# Patient Record
Sex: MALE | Race: WHITE
[De-identification: names, ages, dates, MRNs, and addresses within clinical notes are randomized per-mention and may not be internally consistent; named-entity substitution may affect disease eponyms.]

---

## 2019-02-06 ENCOUNTER — HOSPITAL ENCOUNTER (OUTPATIENT)
Dept: HOSPITAL 84 - D.CATH | Age: 69
Discharge: HOME | End: 2019-02-06
Attending: INTERNAL MEDICINE
Payer: MEDICARE

## 2019-02-06 VITALS
HEIGHT: 73 IN | HEIGHT: 73 IN | BODY MASS INDEX: 41.75 KG/M2 | WEIGHT: 315 LBS | BODY MASS INDEX: 41.75 KG/M2 | WEIGHT: 315 LBS

## 2019-02-06 VITALS — DIASTOLIC BLOOD PRESSURE: 80 MMHG | SYSTOLIC BLOOD PRESSURE: 147 MMHG

## 2019-02-06 DIAGNOSIS — I25.110: Primary | ICD-10-CM

## 2019-02-06 LAB
ANION GAP SERPL CALC-SCNC: 14.9 MMOL/L (ref 8–16)
BASOPHILS NFR BLD AUTO: 0.6 % (ref 0–2)
BUN SERPL-MCNC: 14 MG/DL (ref 7–18)
CALCIUM SERPL-MCNC: 8.7 MG/DL (ref 8.5–10.1)
CHLORIDE SERPL-SCNC: 108 MMOL/L (ref 98–107)
CO2 SERPL-SCNC: 26 MMOL/L (ref 21–32)
CREAT SERPL-MCNC: 1.1 MG/DL (ref 0.6–1.3)
EOSINOPHIL NFR BLD: 2.4 % (ref 0–7)
ERYTHROCYTE [DISTWIDTH] IN BLOOD BY AUTOMATED COUNT: 14.3 % (ref 11.5–14.5)
GLUCOSE SERPL-MCNC: 111 MG/DL (ref 74–106)
HCT VFR BLD CALC: 45.6 % (ref 42–54)
HGB BLD-MCNC: 15.4 G/DL (ref 13.5–17.5)
IMM GRANULOCYTES NFR BLD: 0.4 % (ref 0–5)
LYMPHOCYTES NFR BLD AUTO: 30.2 % (ref 15–50)
MCH RBC QN AUTO: 30.4 PG (ref 26–34)
MCHC RBC AUTO-ENTMCNC: 33.8 G/DL (ref 31–37)
MCV RBC: 89.9 FL (ref 80–100)
MONOCYTES NFR BLD: 8.8 % (ref 2–11)
NEUTROPHILS NFR BLD AUTO: 57.6 % (ref 40–80)
OSMOLALITY SERPL CALC.SUM OF ELEC: 290 MOSM/KG (ref 275–300)
PLATELET # BLD: 217 10X3/UL (ref 130–400)
PMV BLD AUTO: 9.9 FL (ref 7.4–10.4)
POTASSIUM SERPL-SCNC: 3.9 MMOL/L (ref 3.5–5.1)
RBC # BLD AUTO: 5.07 10X6/UL (ref 4.2–6.1)
SODIUM SERPL-SCNC: 145 MMOL/L (ref 136–145)
WBC # BLD AUTO: 7 10X3/UL (ref 4.8–10.8)

## 2019-02-06 NOTE — NUR
4 CC AIR REMOVED FROM ZYPHER BAND WITH NO BLEEDING NOTED. PIV REMOVED
WITH DRESSING APPLIED. PATIENT DENIED CHEST PAIN. UP TO GET DRESSED
FOR DISCHARGE HOME NO DISTRESS NOTED.

## 2019-02-06 NOTE — NUR
ZYPHER BAND REMOVED WITH DRESSING APPLIED. NO BLEEDING OR HEMATOMA
PATIENT DENIED CHEST PAIN.LEFT VIA WC TO PARKING FOR TRANSPORT HOME
WITH WIFE

## 2019-02-06 NOTE — NUR
REPOSITIONED HOB UP 30 WITH ZYPHER BAND TO R/WRIST CDI NO BLEEDING
NOTED. PATIENT DENIED NAUSEA OR CHEST PAIN VSS

## 2019-02-06 NOTE — HEMODYNAMI
PATIENT:SAUL DE LA CRUZ                         MEDICAL RECORD: [id number]
: 50                                            LOCATION:D.CAT          
ACCT# [id number]                                       ADMISSION DATE: 19
 
 
 Generatedon:201913:45
Patient name: SAUL DE LA CRUZ Patient #: [id number] Visit #: [id number] SSN:
 : 1950 Date
of study: 2019
Page: Of
Hemodynamic Procedure Report
****************************
Patient Data
Patient Demographics
Procedure consent was obtained
First Name: SAUL         Gender: Male
Last Name: DOROTHY        : 1950
Middle Initial: RAVI      Age: 68 year(s)
Patient #: [id number]       Race: Unknown
Visit #: [id number]
Accession #:
[id number]
Additional ID: [id number]
Contact details
Address: [place] [address]         Phone: [phone]
State: [place]
City: [place]
Zip code: [address]
Past Medical History
Allergies
Allergen        Reaction        Date         Comments
Reported
Other allergy                   2019    PCN, MOLD
Other allergy                   2019     PCN, MOLD
Admission
Admission Data
Admission Date: 2019    Admission Time: 11:06
Admit Source: Other
Height (in.): .39           BSA: 0.06 (m2)
Height (cm.): 1             BMI: 3956619 (kg/m2)
Weight (lbs.): 340
Weight (kg.): 154.22
Lab Results
Lab Result Date: 2019   Lab Result Time: 0:08
Biochemistry
Name         Units    Result                Min      Max
BUN          mg/dl    14       --(--*-)--   7        18
Creatinine   mg/dl    1.1      --(--*-)--   0.6      1.3
CBC
Name         Units    Result                Min      Max
Hematocrit   %        45.6     --(-*--)--   42       54
Hemoglobin   g/dl     15.4     --(-*--)--   13.5     17.5
Procedure
Procedure Types
Cath Procedure
PCI Procedure
Coronary Stent
Coronary Stent Initial x2
Procedure Description
 
Procedure Date
Procedure Date: 2019
Procedure Start Time: 13:15
Procedure End Time: 13:43
Procedure Staff
Name                            Function
Chapito Alberts RT                  Monitor
Broderick Roth MD                   Performing Physician
Tj Madrigal RT                  Scrub
Gissel Robbins RN                Nurse
Uzma Cruz RT             Monitor
Procedure Data
Cath Procedure
Fluoroscopy
Diagnostic fluoroscopy      Total fluoroscopy Time: 5.5
time: 5.5 min               min
Diagnostic fluoroscopy      Total fluoroscopy dose:
dose: 1894 mGy              1894 mGy
Contrast Material
Contrast Material Type                       Amount (ml)
Isovue 300                                   111
Entry Location
Entry     Primary  Successful  Side  Size  Upsize Upsize Entry    Closure     Blanco
ccessful  Closure
Location                             (Fr)  1 (Fr) 2 (Fr) Remarks  Device        
          Remarks
Radial                         Right 6 Fr                         Mechanical
artery                               Short                        Compression
Estimated blood loss: 5 ml
Procedure Complications
No complications
Procedure Medications
Medication           Administration Route Dosage
0.9% NaCl            I.V.                 100 ml/hr
Oxygen               etCO2 Nasal cannula  2 l/min
Lidocaine 2%         added to field       20
Heparin Flush Bag    added to field       2 bags
(1000units/500ml NS)
Radial Cocktail      added to field       1 syringe
(Verapomil 2mg/Nitro
400mcg/Heparin
1500units)
Versed               I.V.                 2 mg
Fentanyl             I.V.                 50 mcg
Heparin Bolus                             16545 units
Versed               I.V.                 2 mg
Fentanyl             I.V.                 50 mcg
Fentanyl             I.V.                 50 mcg
Versed               I.V.                 2 mg
Plavix               P.O.                 600 mg
Hemodynamics
Rest
BSA: 0.06 (m2) HGB: 15.4 (g/dl) O2 Consumption: Estimated: 6.77 (ml/min) O2 Cons
umption indexed:
Estimated:112.83 (ml/min/m) Heart Rate: 61 (bpm)
Snapshots
Pre Cath      Intra         NCS           Post Cath
Vital Signs
Time     Heart  Resp   SPO2 etCO2   NIBP (mmHg) Rhythm  Pain    Sedation
Rate   (ipm)  (%)  (mmHg)                      Status  Level
 
(bpm)
13:05:25 60     14     99   14.9    171/98(158) NSR     0 (11)  10(A)
, No
pain
13:09:53 62     10     98   15.6    174/97(125) NSR     0 (11)  10(A)
, No
pain
13:14:26 63     11     98   16.8    170/86(123) NSR     0 (11)  10(A)
, No
pain
13:18:50 67     13     98   20.6    144/82(114) NSR     0 (11)  10(A)
, No
pain
13:24:07 67     14     96   32      149/77(111) NSR     0 (11)  9(A)
, No
pain
13:28:29 67     12     96   29      157/87(123) NSR     0 (11)  9(A)
, No
pain
13:32:47 63     13     97   29.8    155/90(120) NSR     0 (11)  9(A)
, No
pain
13:37:10 69     14     98   34.2    145/94(117) NSR     0 (11)  9(A)
, No
pain
13:41:28 67     13     99   34.2    145/85(118) NSR     0 (11)  10(A)
, No
pain
Medications
Time     Medication       Route   Dose    Verified Delivered Reason          Not
es    Effectiveness
by       by
13:02:05 0.9% NaCl        I.V.    100     Broderick     Gissel     used for
ml/hr   Gonzalez Robbins   procedure
RN
13:02:13 Oxygen           etCO2   2 l/min Broderick     Gissel     used for
Nasal           Gonzalez Robbins   procedure
cannula                  RN
13:02:31 Lidocaine 2%     added   20ml    Broderick     Broderick      for local
to      vial    Gonzalez Roth MD  anesthetic
field
13:02:36 Heparin Flush    added   2 bags  Broderick     Broderick      used for
Bag              to              Gonzalez Roth MD  procedure
(1000units/500ml field
NS)
13:02:44 Radial Cocktail  added   1       Broderick     Broderick      used for
(Verapomil       to      syringe Gonzalez Roth MD  procedure
2mg/Nitro        field
400mcg/Hepari
13:13:50 Versed           I.V.    2 mg    Broderick     Gissel     for
Gonzalez Robbins   anticoagulation
RN
13:14:10 Fentanyl         I.V.    50 mcg  Broderick     Gissel     for sedation
Gonzalez Robbins
RN
13:19:29 Heparin Bolus            57066   Broderick     Gissel     for             ponce
ified
units   Gonzalez Robbins   anticoagulation with Dr. BEKA Roth
13:19:48 Fentanyl         I.V.    50 mcg  Broderick     Gissel     for sedation
 
Gonzalez Robbins
RN
13:19:48 Versed           I.V.    2 mg    Broderick     Gissel     for
Gonzalez Robbins   anticoagulation
RN
13:29:16 Fentanyl         I.V.    50 mcg  Broderick     Gissel     for sedation
Gonzalez Robbins
RN
13:29:43 Versed           I.V.    2 mg    Broderick     Gissel     for
Gonzalez Robbins   anticoagulation
RN
13:42:19 Plavix           P.O.    600 mg  Broderick     Gissel     for
Roth MD Rosendo   antiplatelet
RN        therapy
Procedure Log
Time     Note
12:34:40 Informed consent obtained and on chart
12:34:44 Admit Source: Other
12:35:27 Diagnostic Cath status Elective
12:35:28 Time tracking: Regular hours (M-F 7:00 - 5:00)
12:35:32 Plan of Care:Hemodynamics will remain stable., Cardiac
rhythm will remain stable., Comfort level will be
maintained., Respiratory function will remain
adequate., Patient/ family verbilizes understanding of
procedure., Procedure tolerated without complication.,
Recovers from procedure without complications..
12:35:59 H&P Date Dictated: 2019 Within 30 days and on
chart., H&P Addendum completed by physician on day of
procedure. (MUST COMPLETE FOR ALL OUTPATIENTS).
12:36:18 Patient allergic to Other allergyPCN, MOLD
12:39:06 Lab Result : Hemoglobin 15.4 g/dl
12:39:06 Lab Result : Hematocrit 45.6 %
12:39:06 Lab Result : BUN 14 mg/dl
12:39:06 Lab Result : Creatinine 1.1 mg/dl
12:39:08 Lab results completed and on chart.
12:39:14 Chapito Alberts RT(R) (CV) sent for patient. Start room
use.
12:52:33 Patient received from Pre/Post Procedure Room to CCL 1
Alert and oriented. Tansferred to table in Supine
position.
12:52:34 Correct patient and procedure confirmed by team.
12:52:34 Warm blankets applied, and janes hugger turned on for
patient comfort.
12:52:35 ECG and BP/O2 sat monitors applied to patient.
12:52:36 Pre-procedure instructions explained to patient.
12:52:37 Pre-op teaching completed and patient verbalized
understanding.
12:52:38 Family in waiting room.
12:52:39 Patient NPO since Midnight.
12:53:17 Is patient on blood thinner?No
12:53:18 Patient diabetic? No.
12:53:21 Previous problem with sedation/anesthesia? No ?
12:53:22 Sleep apnea? No
12:53:22 Snore? No
12:53:24 Opens mouth fully? Yes
12:53:24 Deviated septum? No
12:53:25 Sticks out tongue? Yes
12:53:27 Airway obstruction? No ?
12:53:30 Dentures? Yes UPPER IN TIGHT
12:53:36 Modified Yang's test Ulnar > 7 seconds.
 
12:53:38 Patient pain scale 0/10 ?.
12:53:42 IV patent on arrival in left forearm with 0.9% NaCl at
[facility].
13:01:40 Vital chart was started
13:02:05 0.9% NaCl 100 ml/hr I.V. was administered by Gissel Robbins RN; used for procedure;
13:02:13 Oxygen 2 l/min etCO2 Nasal cannula was administered by
Gissel Robbins RN; used for procedure;
13:02:31 Lidocaine 2% 20ml vial added to field was administered
by Broderick Roth MD; for local anesthetic;
13:02:36 Heparin Flush Bag (1000units/500ml NS) 2 bags added to
field was administered by Broderick Roth MD; used for
procedure;
13:02:44 Radial Cocktail (Verapomil 2mg/Nitro 400mcg/Heparin
1500units) 1 syringe added to field was administered
by Broderick Roth MD; used for procedure;
13:05:07 Use device set Radial Dx or PCI
13:05:09 ACIST Syringe (06714) opened to sterile field.
13:05:10 Medline Cath Pack ([id number]) opened to sterile field.
13:05:11 Bag Decanter (2002S) opened to sterile field.
13:05:15 ACIST Hand Control (96011) opened to sterile field.
13:05:20 ACIST Manifold (09889) opened to sterile field.
13:05:21 Tegaderm 4 x 4 (1626W) opened to sterile field.
13:05:22 MBrace Wrist Support ([id number]) opened to sterile
field.
13:05:32 SHEATH 6FR Slender () opened to sterile field.
13:06:08 EMERALD Guide Wire (096-433) opened to sterile field.
13:06:57 TUBING High Pressure Extension Tubing (Gonzalez)
(LV2341F) opened to sterile field.
13:09:37 Right Radial & Right Groin area was prepped with
chlora-prep and draped in sterile fashion
13:09:38 Sharps counted by scrub and verified by R.N.
13:09:38 Alarms reviewed by R. N.
13:09:46 Full Disclosure recording started
13:09:47 Baseline sample Acquired.
13:09:51 Rhythm: sinus rhythm
13:12:08 Final Timeout: patient, procedure, and site verified
with staff and physician. All members of the team are
in agreement.
13:12:11 Right groin site verified by team.
13:12:15 Fire Safety Assessment: A--An alcohol-based skin
anteseptic being used preoperatively., B--The
operative or invasive procedure is being performed
above the xiphoid process or in the oropharynx., D--An
ESU, laser, or fiber-optic light is being used.
13:12:22 Physical assessment completed. ASA score P 2 - A
patient with mild systemic disease as per Broderick Roth MD.
13:12:25 Sedation plan: IV Moderate Sedation Medication:Versed,
Fentanyl
13:12:29 Zero performed for pressure channel P1
13:12:42 Zero performed for pressure channel P1
13:13:50 Versed 2 mg I.V. was administered by Gissel Robbins RN;
for anticoagulation;
13:14:10 Fentanyl 50 mcg I.V. was administered by Gissel Robbins
RN; for sedation;
13:15:14 Procedure started.
13:15:20 Local anesthetic to right radial artery with Lidocaine
2% by Broderick Roth MD.**INITIAL ACCESS ONLY**
13:16:38 A 6 Fr Short sheath was inserted into the Right Radial
 
artery
13:17:13 Patient Height : 0.39 inches
13:17:21 Patient Weight : 340 lbs
13:18:29 BMW 300cm Universal 2 J wire ([id number]) opened to
sterile field.
13:18:36 GUIDE 6FR XBLAD 4.0 catheter ([id number]) opened to
sterile field.
13:19:20 6 Fr XBLAD 4.0 guide catheter was inserted over the
wire
13:19:29 Heparin Bolus 14651 units was administered by Gissel Robbins RN; for anticoagulation; verified with Dr. Roth
13:19:48 Fentanyl 50 mcg I.V. was administered by Gissel Robbins
RN; for sedation;
13:19:48 Versed 2 mg I.V. was administered by Gissel Robbins RN;
for anticoagulation;
13:22:52 BMW wire advanced.
13:26:51 Place stent Inflation Number: 1 A AMOL OTW 3.5 x 30
stent ([id number]) was prepped and advanced across
the Prox LAD. The stent was deployed at 14 DARRYN for
0:22 (min:sec).
13:27:35 Stent catheter was removed intact over wire.
13:29:16 Fentanyl 50 mcg I.V. was administered by Gissel Robbins
RN; for sedation;
13:29:43 Versed 2 mg I.V. was administered by Gissel Robbins RN;
for anticoagulation;
13:29:59 Wire redirected to RAMUS.
13:37:49 Place stent Inflation Number: 1 A AMOL OTW 3.5 x 22
stent ([id number]) was prepped and advanced across
the Ramus. The stent was deployed at 13 DARRYN for 0:19
(min:sec).
13:39:05 Stent catheter was removed intact over wire.
13:39:06 Wire removed.
13:39:07 Guide catheter removed.
13:39:17 Sheath removed intact; hemostasis achieved with
Mechanical Compression to the Right Radial artery.
13:39:34 Procedure ended.(Physican Out)
13:39:50 Fluoroscopy time 05.50 minutes.
13:39:54 Fluoroscopy dose: 1894 mGy
13:39:54 Flurop Dose total: 1894
13:39:57 Contrast amount:Isovue 300 111ml.
13:39:59 Sharps counted by scrub and verified by R.N.
13:40:01 TR band inflated with 12cc of air.
13:40:02 Insertion/operative site no bleeding no hematoma.
13:40:06 Post right radial artery:stable, clean and dry
13:40:07 Post Procedure Pulses reassessed and unchanged
13:40:10 Post-procedure physical assessment completed. ASA
score P 2 - A patient with mild systemic disease as
per Broderick Roth MD.
13:40:13 Post procedure rhythm: unchanged.
13:40:16 Estimated blood loss: 5 ml
13:40:17 Post procedure instruction explained to
patient.Patient verbalizes understanding.
13:40:18 Patient needs reinforcement of post procedure
teaching.
13:40:23 Procedure Complication : No complications
13:40:48 ZEPHYR REGULAR TR BAND NO COST(120384) opened to
sterile field.
13:42:19 Plavix 600 mg P.O. was administered by Gissel Robbins RN; for antiplatelet therapy;
 
13:42:56 Procedure type changed to Cath procedure, PCI
procedure, Coronary Stent, Coronary Stent Initial x2
13:43:00 See physician's report for complete and final results.
13:43:35 Procedure and supply charges have been captured,
reviewed, submitted and are correct.
13:43:39 Vital chart was stopped
13:43:41 Report given to ED.
13:43:45 Patient transfered to Pre/Post Procedure Room with
Stretcher.
13:43:54 Full Disclosure recording stopped
13:43:54 Procedure ended.
13:43:57 End room use (Document Last)
Intervention Summary
Intervention Notes
Time     ActionType  Lesion and  Equipment     Action#  Pressure  Duration
Attributes  Used
13:26:51 Place stent Prox LAD    AMOL OTW 3.5  1        14        00:22
x 30 stent
([id number])
13:37:49 Place stent Ramus       AMOL OTW 3.5  1        13        00:19
x 22 stent
([id number])
Device Usage
Item Name     Manufacture  Quantity  Catalog      Hospital Part     Current Mini
mal Lot# /
Number       Charge   Number   Stock   Stock   Serial#
Code
ACIST Syringe Acist        1         12830        133181   290689   745011  20
(15197)       Medical
Systems Inc
Medline Cath  Medline      1         [id number]    259343   64640    562307  5
Pack
([id number])
Bag Decanter  Microtek     1         S        401044   01487    416675  5
(2002S)       Medical Inc.
ACIST Hand    Acist        1         33993        259987   811064   245163  5
Control       Medical
(52725)       Systems Inc
ACIST         Acist        1         71028        682580   272663   095962  5
Manifold      Medical
(82039)       Systems Inc
Tegaderm 4 x  3M           1         1626W        099137   256489   011549  5
4 (1626W)
MBrace Wrist  Advanced     1         140-0250-00  033363   44751    477771  5
Support       Vascular
(748693927)   Dynamics
SHEATH 6FR    Terumo       1         [id number]   768040   222568   235830  5
Slender
()
EMERALD Guide Cardinal     1         502-455      346003            736481  5
Wire          Health
(502-918)
TUBING High   Merit        1         [id number]      645727   16799    156880  10
Pressure      Medical
Extension
Tubing
(Roth)
(WA8250U)
BMW 300cm     Abbott       1         4089932S     871150   322751   838798  5
[place] 2 J Vascular
 
wire
(6656265G)
GUIDE 6FR     Cardinal     1         30453581     726895   306138   432747  3
XBLAD 4.0     Health
catheter
(65423117)
AMOL OTW 3.5  Medtronic    1         [id number]  185088   8774557  549849  5   
    [id number]
x 30 stent
([id number])
AMOL OTW 3.5  Medtronic    1         [id number]  493209   7654485  693758  5   
    [id number]
x 22 stent
([id number])
ZEPHYR        Cardinal     1         539073       144681            515826  5
REGULAR TR    Health
BAND NO
COST(310597)
Signature Audit [place]
Stage           Time        Signature      Unsigned
Intra-Procedure 2019    Uzma
1:45:35 PM  Counts RT(R)
Signatures
Monitor : Chapito Alberts RT Signature :
______________________________
Date : ______________ Time :
______________
Monitor : Uzma     Signature :
Counts RT               ______________________________
Date : ______________ Time :
______________
 
 
 
 
 
 
 
 
 
 
 
 
 
 
 
 
 
 
 
 
 
 
 
 
[address] [place] [address]

## 2019-02-06 NOTE — NUR
4 CC AIR REMOVED FROM ZYPHER BAND WITH NO BLEEDING NOTED. VERBAL AND
WRITTEN DISCHARGE GONE OVER WITH PATIENT AND WIFE.

## 2019-02-06 NOTE — NUR
ZYPHER BAND TO R/WRIST IS CDI WITH NO BLEEDING OR HEMATOMA NOTED.
PATIENT DENIED CHEST PAIN CALL LIGHT IS IN REACH WITH FAMILY AT
BEDSDIE

## 2019-02-06 NOTE — NUR
RECIEVED TO ROOM VIA STRETCHER FROM CATH LAB WITH TR BAND TO R/WRIST
CDI NO BLEEDING OR HEMATOMA NOTED. PATIENT CONNECTED TO MONITOR FOR
OBSERVATION WITH SB RATE OF 58 CHEST PAIN IS DENIED

## 2020-01-09 ENCOUNTER — HOSPITAL ENCOUNTER (EMERGENCY)
Dept: HOSPITAL 84 - D.ER | Age: 70
Discharge: HOME | End: 2020-01-09
Payer: MEDICARE

## 2020-01-09 VITALS
HEIGHT: 73 IN | WEIGHT: 315 LBS | BODY MASS INDEX: 41.75 KG/M2 | BODY MASS INDEX: 41.75 KG/M2 | WEIGHT: 315 LBS | HEIGHT: 73 IN

## 2020-01-09 VITALS — DIASTOLIC BLOOD PRESSURE: 74 MMHG | SYSTOLIC BLOOD PRESSURE: 143 MMHG

## 2020-01-09 DIAGNOSIS — M54.5: Primary | ICD-10-CM

## 2020-01-09 DIAGNOSIS — I77.819: ICD-10-CM

## 2020-01-09 DIAGNOSIS — M47.816: ICD-10-CM

## 2020-01-09 LAB
ALBUMIN SERPL-MCNC: 3.8 G/DL (ref 3.4–5)
ALP SERPL-CCNC: 75 U/L (ref 46–116)
ALT SERPL-CCNC: 32 U/L (ref 10–68)
ANION GAP SERPL CALC-SCNC: 16.3 MMOL/L (ref 8–16)
APPEARANCE UR: CLEAR
APTT BLD: 30.8 SECONDS (ref 22.8–39.4)
BASOPHILS NFR BLD AUTO: 0.6 % (ref 0–2)
BILIRUB SERPL-MCNC: 0.55 MG/DL (ref 0.2–1.3)
BILIRUB SERPL-MCNC: NEGATIVE MG/DL
BUN SERPL-MCNC: 15 MG/DL (ref 7–18)
CALCIUM SERPL-MCNC: 8.6 MG/DL (ref 8.5–10.1)
CHLORIDE SERPL-SCNC: 104 MMOL/L (ref 98–107)
CO2 SERPL-SCNC: 25.9 MMOL/L (ref 21–32)
COLOR UR: YELLOW
CREAT SERPL-MCNC: 0.8 MG/DL (ref 0.6–1.3)
EOSINOPHIL NFR BLD: 2.6 % (ref 0–7)
ERYTHROCYTE [DISTWIDTH] IN BLOOD BY AUTOMATED COUNT: 13.5 % (ref 11.5–14.5)
GLOBULIN SER-MCNC: 3.6 G/L
GLUCOSE SERPL-MCNC: 115 MG/DL (ref 74–106)
GLUCOSE SERPL-MCNC: NEGATIVE MG/DL
HCT VFR BLD CALC: 47.4 % (ref 42–54)
HGB BLD-MCNC: 16.1 G/DL (ref 13.5–17.5)
IMM GRANULOCYTES NFR BLD: 1.5 % (ref 0–5)
INR PPP: 1.03 (ref 0.85–1.17)
KETONES UR STRIP-MCNC: NEGATIVE MG/DL
LYMPHOCYTES NFR BLD AUTO: 23 % (ref 15–50)
MCH RBC QN AUTO: 31.3 PG (ref 26–34)
MCHC RBC AUTO-ENTMCNC: 34 G/DL (ref 31–37)
MCV RBC: 92.2 FL (ref 80–100)
MONOCYTES NFR BLD: 7.3 % (ref 2–11)
NEUTROPHILS NFR BLD AUTO: 65 % (ref 40–80)
NITRITE UR-MCNC: NEGATIVE MG/ML
OSMOLALITY SERPL CALC.SUM OF ELEC: 282 MOSM/KG (ref 275–300)
PH UR STRIP: 5 [PH] (ref 5–6)
PLATELET # BLD: 239 10X3/UL (ref 130–400)
PMV BLD AUTO: 9.7 FL (ref 7.4–10.4)
POTASSIUM SERPL-SCNC: 5.2 MMOL/L (ref 3.5–5.1)
PROT SERPL-MCNC: 7.4 G/DL (ref 6.4–8.2)
PROT UR-MCNC: (no result) MG/DL
PROTHROMBIN TIME: 13.5 SECONDS (ref 11.6–15)
RBC # BLD AUTO: 5.14 10X6/UL (ref 4.2–6.1)
SODIUM SERPL-SCNC: 141 MMOL/L (ref 136–145)
SP GR UR STRIP: 1.01 (ref 1–1.02)
UROBILINOGEN UR-MCNC: NORMAL MG/DL
WBC # BLD AUTO: 6.8 10X3/UL (ref 4.8–10.8)

## 2020-01-14 ENCOUNTER — HOSPITAL ENCOUNTER (INPATIENT)
Dept: HOSPITAL 84 - D.ER | Age: 70
LOS: 4 days | Discharge: HOME | DRG: 552 | End: 2020-01-18
Attending: INTERNAL MEDICINE | Admitting: INTERNAL MEDICINE
Payer: MEDICARE

## 2020-01-14 VITALS
WEIGHT: 315 LBS | BODY MASS INDEX: 41.75 KG/M2 | BODY MASS INDEX: 41.75 KG/M2 | HEIGHT: 73 IN | WEIGHT: 315 LBS | HEIGHT: 73 IN | BODY MASS INDEX: 41.75 KG/M2

## 2020-01-14 VITALS — SYSTOLIC BLOOD PRESSURE: 142 MMHG | DIASTOLIC BLOOD PRESSURE: 66 MMHG

## 2020-01-14 VITALS — DIASTOLIC BLOOD PRESSURE: 75 MMHG | SYSTOLIC BLOOD PRESSURE: 131 MMHG

## 2020-01-14 VITALS — SYSTOLIC BLOOD PRESSURE: 126 MMHG | DIASTOLIC BLOOD PRESSURE: 68 MMHG

## 2020-01-14 DIAGNOSIS — K21.9: ICD-10-CM

## 2020-01-14 DIAGNOSIS — M54.5: ICD-10-CM

## 2020-01-14 DIAGNOSIS — G47.33: ICD-10-CM

## 2020-01-14 DIAGNOSIS — N40.0: ICD-10-CM

## 2020-01-14 DIAGNOSIS — K59.00: ICD-10-CM

## 2020-01-14 DIAGNOSIS — M48.07: Primary | ICD-10-CM

## 2020-01-14 DIAGNOSIS — M79.7: ICD-10-CM

## 2020-01-14 DIAGNOSIS — E66.01: ICD-10-CM

## 2020-01-14 DIAGNOSIS — E78.5: ICD-10-CM

## 2020-01-14 DIAGNOSIS — I10: ICD-10-CM

## 2020-01-14 DIAGNOSIS — I77.811: ICD-10-CM

## 2020-01-14 NOTE — NUR
RECIEVED TO FLOOR. A&O X 4. VS STABLE. REPORTS SEVERE PAIN IN BACK. SIDE-LYING
IN BED. POSEY IN USE, NO FURTHER NEEDS AT THIS TIME, WILL CONTINUE TO MONITOR.

## 2020-01-15 VITALS — SYSTOLIC BLOOD PRESSURE: 163 MMHG | DIASTOLIC BLOOD PRESSURE: 95 MMHG

## 2020-01-15 VITALS — SYSTOLIC BLOOD PRESSURE: 117 MMHG | DIASTOLIC BLOOD PRESSURE: 64 MMHG

## 2020-01-15 VITALS — SYSTOLIC BLOOD PRESSURE: 124 MMHG | DIASTOLIC BLOOD PRESSURE: 68 MMHG

## 2020-01-15 VITALS — DIASTOLIC BLOOD PRESSURE: 77 MMHG | SYSTOLIC BLOOD PRESSURE: 133 MMHG

## 2020-01-15 VITALS — DIASTOLIC BLOOD PRESSURE: 78 MMHG | SYSTOLIC BLOOD PRESSURE: 131 MMHG

## 2020-01-15 VITALS — SYSTOLIC BLOOD PRESSURE: 132 MMHG | DIASTOLIC BLOOD PRESSURE: 81 MMHG

## 2020-01-15 LAB
ALBUMIN SERPL-MCNC: 3.4 G/DL (ref 3.4–5)
ALP SERPL-CCNC: 77 U/L (ref 46–116)
ALT SERPL-CCNC: 27 U/L (ref 10–68)
ANION GAP SERPL CALC-SCNC: 12.7 MMOL/L (ref 8–16)
APPEARANCE UR: CLEAR
APTT BLD: 34.8 SECONDS (ref 22.8–39.4)
BACTERIA #/AREA URNS HPF: (no result) /HPF
BASOPHILS NFR BLD AUTO: 0.1 % (ref 0–2)
BILIRUB SERPL-MCNC: 0.4 MG/DL (ref 0.2–1.3)
BILIRUB SERPL-MCNC: NEGATIVE MG/DL
BUN SERPL-MCNC: 18 MG/DL (ref 7–18)
CALCIUM SERPL-MCNC: 8.6 MG/DL (ref 8.5–10.1)
CHLORIDE SERPL-SCNC: 105 MMOL/L (ref 98–107)
CO2 SERPL-SCNC: 27.4 MMOL/L (ref 21–32)
COLOR UR: YELLOW
CREAT SERPL-MCNC: 1.1 MG/DL (ref 0.6–1.3)
EOSINOPHIL NFR BLD: 0 % (ref 0–7)
ERYTHROCYTE [DISTWIDTH] IN BLOOD BY AUTOMATED COUNT: 13 % (ref 11.5–14.5)
GLOBULIN SER-MCNC: 3.4 G/L
GLUCOSE SERPL-MCNC: 138 MG/DL (ref 74–106)
GLUCOSE SERPL-MCNC: NEGATIVE MG/DL
HCT VFR BLD CALC: 47.8 % (ref 42–54)
HGB BLD-MCNC: 16.3 G/DL (ref 13.5–17.5)
IMM GRANULOCYTES NFR BLD: 0.3 % (ref 0–5)
INR PPP: 1.1 (ref 0.85–1.17)
KETONES UR STRIP-MCNC: NEGATIVE MG/DL
LYMPHOCYTES NFR BLD AUTO: 13.9 % (ref 15–50)
MAGNESIUM SERPL-MCNC: 2.2 MG/DL (ref 1.8–2.4)
MCH RBC QN AUTO: 31.7 PG (ref 26–34)
MCHC RBC AUTO-ENTMCNC: 34.1 G/DL (ref 31–37)
MCV RBC: 92.8 FL (ref 80–100)
MONOCYTES NFR BLD: 3 % (ref 2–11)
NEUTROPHILS NFR BLD AUTO: 82.7 % (ref 40–80)
NITRITE UR-MCNC: NEGATIVE MG/ML
NT-PROBNP SERPL-MCNC: 145 PG/ML (ref 0–125)
OSMOLALITY SERPL CALC.SUM OF ELEC: 282 MOSM/KG (ref 275–300)
PH UR STRIP: 7 [PH] (ref 5–6)
PHOSPHATE SERPL-MCNC: 3.6 MG/DL (ref 2.5–4.9)
PLATELET # BLD: 258 10X3/UL (ref 130–400)
PMV BLD AUTO: 9.8 FL (ref 7.4–10.4)
POTASSIUM SERPL-SCNC: 5.1 MMOL/L (ref 3.5–5.1)
PROT SERPL-MCNC: 6.8 G/DL (ref 6.4–8.2)
PROT UR-MCNC: (no result) MG/DL
PROTHROMBIN TIME: 14.1 SECONDS (ref 11.6–15)
RBC # BLD AUTO: 5.15 10X6/UL (ref 4.2–6.1)
RBC #/AREA URNS HPF: (no result) /HPF (ref 0–5)
SODIUM SERPL-SCNC: 140 MMOL/L (ref 136–145)
SP GR UR STRIP: 1.01 (ref 1–1.02)
SQUAMOUS #/AREA URNS HPF: (no result) /HPF (ref 0–5)
UROBILINOGEN UR-MCNC: NORMAL MG/DL
WBC # BLD AUTO: 7.3 10X3/UL (ref 4.8–10.8)
WBC #/AREA URNS HPF: (no result) /HPF

## 2020-01-15 NOTE — NUR
WIFE HERE, PT REFUSING MEDS THIS AM SINCE PILLS WERE IN A DIFFERENT DOSE THAN
HE WAS USED TO, PCA INFUSING, NO DISTRESS NOTED

## 2020-01-16 VITALS — DIASTOLIC BLOOD PRESSURE: 70 MMHG | SYSTOLIC BLOOD PRESSURE: 136 MMHG

## 2020-01-16 VITALS — DIASTOLIC BLOOD PRESSURE: 68 MMHG | SYSTOLIC BLOOD PRESSURE: 114 MMHG

## 2020-01-16 VITALS — SYSTOLIC BLOOD PRESSURE: 111 MMHG | DIASTOLIC BLOOD PRESSURE: 62 MMHG

## 2020-01-16 VITALS — SYSTOLIC BLOOD PRESSURE: 105 MMHG | DIASTOLIC BLOOD PRESSURE: 56 MMHG

## 2020-01-16 VITALS — DIASTOLIC BLOOD PRESSURE: 66 MMHG | SYSTOLIC BLOOD PRESSURE: 128 MMHG

## 2020-01-16 VITALS — SYSTOLIC BLOOD PRESSURE: 125 MMHG | DIASTOLIC BLOOD PRESSURE: 62 MMHG

## 2020-01-16 LAB
ANION GAP SERPL CALC-SCNC: 7.4 MMOL/L (ref 8–16)
BASOPHILS NFR BLD AUTO: 0.1 % (ref 0–2)
BUN SERPL-MCNC: 23 MG/DL (ref 7–18)
CALCIUM SERPL-MCNC: 8.1 MG/DL (ref 8.5–10.1)
CHLORIDE SERPL-SCNC: 106 MMOL/L (ref 98–107)
CO2 SERPL-SCNC: 33.7 MMOL/L (ref 21–32)
CREAT SERPL-MCNC: 1.2 MG/DL (ref 0.6–1.3)
EOSINOPHIL NFR BLD: 0.5 % (ref 0–7)
ERYTHROCYTE [DISTWIDTH] IN BLOOD BY AUTOMATED COUNT: 13.7 % (ref 11.5–14.5)
GLUCOSE SERPL-MCNC: 98 MG/DL (ref 74–106)
HCT VFR BLD CALC: 47.3 % (ref 42–54)
HGB BLD-MCNC: 15.5 G/DL (ref 13.5–17.5)
IMM GRANULOCYTES NFR BLD: 0.8 % (ref 0–5)
LYMPHOCYTES NFR BLD AUTO: 22.4 % (ref 15–50)
MAGNESIUM SERPL-MCNC: 2.3 MG/DL (ref 1.8–2.4)
MCH RBC QN AUTO: 31.1 PG (ref 26–34)
MCHC RBC AUTO-ENTMCNC: 32.8 G/DL (ref 31–37)
MCV RBC: 95 FL (ref 80–100)
MONOCYTES NFR BLD: 10.3 % (ref 2–11)
NEUTROPHILS NFR BLD AUTO: 65.9 % (ref 40–80)
OSMOLALITY SERPL CALC.SUM OF ELEC: 288 MOSM/KG (ref 275–300)
PHOSPHATE SERPL-MCNC: 3.7 MG/DL (ref 2.5–4.9)
PLATELET # BLD: 268 10X3/UL (ref 130–400)
PMV BLD AUTO: 9.9 FL (ref 7.4–10.4)
POTASSIUM SERPL-SCNC: 4.1 MMOL/L (ref 3.5–5.1)
RBC # BLD AUTO: 4.98 10X6/UL (ref 4.2–6.1)
SODIUM SERPL-SCNC: 143 MMOL/L (ref 136–145)
WBC # BLD AUTO: 10.1 10X3/UL (ref 4.8–10.8)

## 2020-01-16 NOTE — NUR
AWAKE AND ALERT. ORIENTED X3. NO C/O AT THIS TIME. LUNGS ARE DIMINISHED
THROUGHOUT LUNG HALEY, NON PRODUCTIVE COUGH NOTED. SKIN IS INTACT WITHOUT
REDNESS. IV TO RIGHT HAND IS PATETN WITHOUT REDNESS AT INSERTION SITE. DENIES
NEEDS. NO BM IN ALMOST A WEEK. WILL NOTIFY MD.

## 2020-01-17 VITALS — SYSTOLIC BLOOD PRESSURE: 148 MMHG | DIASTOLIC BLOOD PRESSURE: 65 MMHG

## 2020-01-17 VITALS — SYSTOLIC BLOOD PRESSURE: 152 MMHG | DIASTOLIC BLOOD PRESSURE: 77 MMHG

## 2020-01-17 VITALS — DIASTOLIC BLOOD PRESSURE: 64 MMHG | SYSTOLIC BLOOD PRESSURE: 135 MMHG

## 2020-01-17 VITALS — DIASTOLIC BLOOD PRESSURE: 75 MMHG | SYSTOLIC BLOOD PRESSURE: 138 MMHG

## 2020-01-17 VITALS — SYSTOLIC BLOOD PRESSURE: 117 MMHG | DIASTOLIC BLOOD PRESSURE: 56 MMHG

## 2020-01-17 LAB
ANION GAP SERPL CALC-SCNC: 7.8 MMOL/L (ref 8–16)
BASOPHILS NFR BLD AUTO: 0.3 % (ref 0–2)
BUN SERPL-MCNC: 19 MG/DL (ref 7–18)
CALCIUM SERPL-MCNC: 8 MG/DL (ref 8.5–10.1)
CHLORIDE SERPL-SCNC: 109 MMOL/L (ref 98–107)
CO2 SERPL-SCNC: 33.5 MMOL/L (ref 21–32)
CREAT SERPL-MCNC: 1.1 MG/DL (ref 0.6–1.3)
EOSINOPHIL NFR BLD: 1.5 % (ref 0–7)
ERYTHROCYTE [DISTWIDTH] IN BLOOD BY AUTOMATED COUNT: 14 % (ref 11.5–14.5)
GLUCOSE SERPL-MCNC: 95 MG/DL (ref 74–106)
HCT VFR BLD CALC: 47.2 % (ref 42–54)
HGB BLD-MCNC: 14.9 G/DL (ref 13.5–17.5)
IMM GRANULOCYTES NFR BLD: 0.6 % (ref 0–5)
LYMPHOCYTES NFR BLD AUTO: 32.9 % (ref 15–50)
MAGNESIUM SERPL-MCNC: 2.1 MG/DL (ref 1.8–2.4)
MCH RBC QN AUTO: 31 PG (ref 26–34)
MCHC RBC AUTO-ENTMCNC: 31.6 G/DL (ref 31–37)
MCV RBC: 98.1 FL (ref 80–100)
MONOCYTES NFR BLD: 11 % (ref 2–11)
NEUTROPHILS NFR BLD AUTO: 53.7 % (ref 40–80)
OSMOLALITY SERPL CALC.SUM OF ELEC: 292 MOSM/KG (ref 275–300)
PHOSPHATE SERPL-MCNC: 3.5 MG/DL (ref 2.5–4.9)
PLATELET # BLD: 230 10X3/UL (ref 130–400)
PMV BLD AUTO: 10.2 FL (ref 7.4–10.4)
POTASSIUM SERPL-SCNC: 4.3 MMOL/L (ref 3.5–5.1)
RBC # BLD AUTO: 4.81 10X6/UL (ref 4.2–6.1)
SODIUM SERPL-SCNC: 146 MMOL/L (ref 136–145)
WBC # BLD AUTO: 10.3 10X3/UL (ref 4.8–10.8)

## 2020-01-17 NOTE — NUR
PT RESTING IN BED. EYES CLOSED. NO SIGNS OF DISTRESS. BREATHING EVEN AND
UNLABORED. IV SITE RT HAND DRESSING CLEAN DRY AND INTACT. NO SIGNS OF
INFECTION OR INFULTRATION. LUNG SOUNDS DIMINISHED. BOWEL SOUNDS ACTIVE. ABD
DISTENDED. 3LO2 NASAL CANNULA. MORPHINE PCA GOING AT 1/10/10. WILL CONTINUE
PLAN OF CARE. CALL LIGHT IN REACH. BED LOWERED AND LOCKED. BED RAILS UPX2.

## 2020-01-17 NOTE — NUR
PATIENT RESTING IN BED WITH EYES CLOSED. NO S/S OF DISTRESS. NO COMPLAINTS AT
THIS TIME. PATIENT HAS IV IN R HAND NORMAL SALINE @ 30 ML/HR. IV IS PATENT
WITHOUT REDNESS, SWELLING, OR TENDERNESS. PATIENT IS ON TELEMETRY: HR 58 SINUE
TAB WITH A BUNDLE BRANCH BLOCK. PATIENT IN ON 3L NASAL CANNULA O2. PATIENT
HAS A PCA PUMP FOR BACK PAIN. CALL LGIHT IN PLACE. WILL CONTIUE TO MONITOR.

## 2020-01-18 VITALS — DIASTOLIC BLOOD PRESSURE: 81 MMHG | SYSTOLIC BLOOD PRESSURE: 168 MMHG

## 2020-01-18 VITALS — DIASTOLIC BLOOD PRESSURE: 73 MMHG | SYSTOLIC BLOOD PRESSURE: 140 MMHG

## 2020-01-18 LAB
ANION GAP SERPL CALC-SCNC: 10.7 MMOL/L (ref 8–16)
BASOPHILS NFR BLD AUTO: 0.2 % (ref 0–2)
BUN SERPL-MCNC: 15 MG/DL (ref 7–18)
CALCIUM SERPL-MCNC: 8.1 MG/DL (ref 8.5–10.1)
CHLORIDE SERPL-SCNC: 106 MMOL/L (ref 98–107)
CO2 SERPL-SCNC: 32.3 MMOL/L (ref 21–32)
CREAT SERPL-MCNC: 1 MG/DL (ref 0.6–1.3)
EOSINOPHIL NFR BLD: 2.9 % (ref 0–7)
ERYTHROCYTE [DISTWIDTH] IN BLOOD BY AUTOMATED COUNT: 13.8 % (ref 11.5–14.5)
GLUCOSE SERPL-MCNC: 85 MG/DL (ref 74–106)
HCT VFR BLD CALC: 45.9 % (ref 42–54)
HGB BLD-MCNC: 14.6 G/DL (ref 13.5–17.5)
IMM GRANULOCYTES NFR BLD: 0.5 % (ref 0–5)
LYMPHOCYTES NFR BLD AUTO: 30.1 % (ref 15–50)
MAGNESIUM SERPL-MCNC: 2.2 MG/DL (ref 1.8–2.4)
MCH RBC QN AUTO: 30.7 PG (ref 26–34)
MCHC RBC AUTO-ENTMCNC: 31.8 G/DL (ref 31–37)
MCV RBC: 96.6 FL (ref 80–100)
MONOCYTES NFR BLD: 14 % (ref 2–11)
NEUTROPHILS NFR BLD AUTO: 52.3 % (ref 40–80)
OSMOLALITY SERPL CALC.SUM OF ELEC: 288 MOSM/KG (ref 275–300)
PHOSPHATE SERPL-MCNC: 3.6 MG/DL (ref 2.5–4.9)
PLATELET # BLD: 247 10X3/UL (ref 130–400)
PMV BLD AUTO: 10.4 FL (ref 7.4–10.4)
POTASSIUM SERPL-SCNC: 4 MMOL/L (ref 3.5–5.1)
RBC # BLD AUTO: 4.75 10X6/UL (ref 4.2–6.1)
SODIUM SERPL-SCNC: 145 MMOL/L (ref 136–145)
WBC # BLD AUTO: 10 10X3/UL (ref 4.8–10.8)

## 2020-01-18 NOTE — NUR
PATIENT RECIEVED DC INSTRUCTIONS. VERBALIZED UNDERSTANDING. NO QUESTIONS AT
THIS TIME. IV REMOVED WITH CATH TIP INTACT. NO NEW MEDS. CALL LIGHT WITHIN
REACH. FAMILY AT BEDSIDE.

## 2020-01-18 NOTE — MORECARE
CASE MANAGEMENT DISCHARGE SUMMARY
 
 
PATIENT: SAUL DE LA CRUZ            UNIT: [id number]
ACCOUNT#: [id number]                       ADM DATE: 01/15/20
AGE: 69     : [birth year]  SEX: M            ROOM/BED: D.2216    
AUTHOR: SEAN,DOC                             PHYSICIAN:                               
 
REFERRING PHYSICIAN: JIHAN EDWARDS MD               
DATE OF SERVICE: 20
Discharge Plan
 
 
Patient Name: SAUL DE LA CRUZ
Facility: [facility]:[place]
Encounter #: [id number]
Medical Record #: [id number]
: 1950
Planned Disposition: Home
Anticipated Discharge Date: 
 
Discharge Date: 2020
Expected LOS: 
Initial Reviewer: [id number]
Initial Review Date: 2020
Generated: 20   7:16 pm 
Comments
 
DCP- Discharge Planning
 
Updated by [id number]: Rekha Galvez on 20   5:11 pm CT
Patient Name: SAUL DE LA CRUZ                                     
Admission Status: ER   
Accout number: [id number]                              
Admission Date: 01-   
: 1950                                                        
Admission Diagnosis:   
Attending: JIHAN EDWARDS                                                
Current LOS:  3   
  
Anticipated DC Date:    
Planned Disposition: Home   
Primary Insurance: MEDICARE A & B   
  
  
Discharge Planning Comments: CM met with patient at bedside after explaining 
CM role and obtaining verbal consent. Patient lives at home with his wife 
where he is independent with his care and plans to return there upon 
discharge. Patient feels this would be a safe discharge. CM discussed 
availability / needs of home health and medical equipment. Patient denies any 
 
discharge needs at this time. Patient states he will have his family drive 
him home upon discharge. CM will continue to follow and assist as needed with 
discharge planning / needs.  D/C IMM signed 20 @ 1208  
  
  
  
  
  
  
: Rekha Galvez
 DCPIA - Discharge Planning Initial Assessment
 
Updated by [id number]: Rekha Galvez on 20   6:10 pm
*  Is the patient Alert and Oriented?
Yes
*  How many steps to enter\exit or inside your home?
 
*  PCP
[facility]
*  Pharmacy
[facility]
*  Preadmission Environment
Home with Family
*  ADLs
Independent
*  Other Equipment
CANE, WALKER
*  List name and contact numbers for known caregivers / representatives who 
currently or will assist patient after discharge:
MIKAYLA DE LA CRUZ - SPOUSE- [phone]
*  Verbal permission to speak to the caregivers and representatives has been 
obtained from the patient.
Yes
*  Community resources currently utilized
None
*  Additional services required to return to the preadmission environment?
No
*  Can the patient safely return to the preadmission environment?
Yes
*  Has this patient been hospitalized within the prior 30 days at any 
hospital?
No
 
 
 
 
 
Coverage Notice
 
Reviewer: [email or web address] Jez Galvez
 
Notice Issued Date-Time: 2020  12:08
 
Notice Type: IM Discharge Notice
 
Notice Delivered To: Patient
Relationship to Patient: Self
Representative Name: 
 
Delivery Method: HAND - Hand Delivered
Sulma Days:
Prior Verbal Notification: 
 
Recipient Understood Notice: Yes
Recipient Signature: Yes
Med Rec Note Co-signed by Attending:
 
Coverage Notice Comment:  
Patient Name: SALU DE LA CRUZ
Encounter #: [id number]
Page [phone]
 
 
 
 
 
Electronically Signed by RAMU ESTRADA on 20 at 1816
 
 
 
 
 
 
**All edits/amendments must be made on the electronic document**
 
DICTATION DATE: 20     : IBRAHIMA  20     
RPT#: [id number]                                DC DATE:20
                                               STATUS: DIS IN  
[facility]
[address] [place] [address]
***END OF REPORT***

## 2020-04-23 ENCOUNTER — HOSPITAL ENCOUNTER (OUTPATIENT)
Dept: HOSPITAL 84 - D.HCCECHO | Age: 70
Discharge: HOME | End: 2020-04-23
Attending: INTERNAL MEDICINE
Payer: MEDICARE

## 2020-04-23 VITALS — BODY MASS INDEX: 44.9 KG/M2

## 2020-04-23 DIAGNOSIS — R06.00: Primary | ICD-10-CM

## 2020-04-23 DIAGNOSIS — I25.10: ICD-10-CM

## 2020-05-04 ENCOUNTER — HOSPITAL ENCOUNTER (OUTPATIENT)
Dept: HOSPITAL 84 - D.CATH | Age: 70
Discharge: HOME | End: 2020-05-04
Attending: INTERNAL MEDICINE
Payer: MEDICARE

## 2020-05-04 VITALS — HEIGHT: 73 IN | WEIGHT: 315 LBS | BODY MASS INDEX: 41.75 KG/M2 | BODY MASS INDEX: 41.75 KG/M2

## 2020-05-04 VITALS — DIASTOLIC BLOOD PRESSURE: 77 MMHG | SYSTOLIC BLOOD PRESSURE: 141 MMHG

## 2020-05-04 DIAGNOSIS — Z72.0: ICD-10-CM

## 2020-05-04 DIAGNOSIS — R94.39: ICD-10-CM

## 2020-05-04 DIAGNOSIS — E78.5: ICD-10-CM

## 2020-05-04 DIAGNOSIS — I10: ICD-10-CM

## 2020-05-04 DIAGNOSIS — I25.119: Primary | ICD-10-CM

## 2020-05-04 DIAGNOSIS — K21.9: ICD-10-CM

## 2020-05-04 LAB
ALT SERPL-CCNC: 26 U/L (ref 10–68)
ANION GAP SERPL CALC-SCNC: 12.7 MMOL/L (ref 8–16)
BASOPHILS NFR BLD AUTO: 0.3 % (ref 0–2)
BUN SERPL-MCNC: 13 MG/DL (ref 7–18)
CALCIUM SERPL-MCNC: 8.6 MG/DL (ref 8.5–10.1)
CHLORIDE SERPL-SCNC: 107 MMOL/L (ref 98–107)
CHOLEST/HDLC SERPL: 5.1 RATIO (ref 2.3–4.9)
CO2 SERPL-SCNC: 29.6 MMOL/L (ref 21–32)
CREAT SERPL-MCNC: 1.2 MG/DL (ref 0.6–1.3)
EOSINOPHIL NFR BLD: 2.2 % (ref 0–7)
ERYTHROCYTE [DISTWIDTH] IN BLOOD BY AUTOMATED COUNT: 14.3 % (ref 11.5–14.5)
GLUCOSE SERPL-MCNC: 106 MG/DL (ref 74–106)
HCT VFR BLD CALC: 46.8 % (ref 42–54)
HDLC SERPL-MCNC: 28 MG/DL (ref 32–96)
HGB BLD-MCNC: 15.3 G/DL (ref 13.5–17.5)
IMM GRANULOCYTES NFR BLD: 0.4 % (ref 0–5)
LDL-HDL RATIO: 2.7 RATIO (ref 1.5–3.5)
LDLC SERPL-MCNC: 75 MG/DL (ref 0–100)
LYMPHOCYTES NFR BLD AUTO: 27 % (ref 15–50)
MCH RBC QN AUTO: 30.3 PG (ref 26–34)
MCHC RBC AUTO-ENTMCNC: 32.7 G/DL (ref 31–37)
MCV RBC: 92.7 FL (ref 80–100)
MONOCYTES NFR BLD: 9.5 % (ref 2–11)
NEUTROPHILS NFR BLD AUTO: 60.6 % (ref 40–80)
OSMOLALITY SERPL CALC.SUM OF ELEC: 288 MOSM/KG (ref 275–300)
PLATELET # BLD: 279 10X3/UL (ref 130–400)
PMV BLD AUTO: 10.4 FL (ref 7.4–10.4)
POTASSIUM SERPL-SCNC: 4.3 MMOL/L (ref 3.5–5.1)
RBC # BLD AUTO: 5.05 10X6/UL (ref 4.2–6.1)
SODIUM SERPL-SCNC: 145 MMOL/L (ref 136–145)
TRIGL SERPL-MCNC: 202 MG/DL (ref 30–200)
WBC # BLD AUTO: 7 10X3/UL (ref 4.8–10.8)

## 2020-05-04 NOTE — NUR
4cc OF AIR REMOVED FROM Z BAND. NO BLEEDING/HEMATOMA NOTED.
TOLERATING WELL. VSS AT THIS TIME. PT SET UP WITH SANDWICH TRAY AND
DRINK. DENIES NAUSEA/PAIN AT THIS TIME.

## 2020-05-04 NOTE — NUR
RIGHT WRIST Z BAND IN PLACE. NO BLEEDING/HEMATOMA NOTED. CALL LIGHT
WITHIN REACH. VSS AT THIS TIME. PT STILL SLEEPING. EASILY AROUSED
FROM SLEEP.

## 2020-05-04 NOTE — NUR
4cc OF AIR REMOVED FROM Z BAND. NO BLEEDING/HEMATOMA NOTED. CALL
LIGHT WITHIN REACH. VSS AT THIS TIME.

## 2020-05-04 NOTE — NUR
PT TAKEN DOWN TO VEHICLE BY WHEELCHAIR. NO S/S OF DISTRESS NOTED.
DISCUSSED DISCHARGE INSTRUCTIONS WITH PT'S WIFE. SHE VOICED
UNDERSTANDING. ALL BELONGINGS AND PAPERWORK IN HAND.

## 2020-05-04 NOTE — NUR
PT RESTING COMFORTABLY. VSS. RIGHT WRIST Z BAND IN PLACE. NO
BLEEDING/HEMATOMA NOTED. NO NEEDS AT THIS TIME.

## 2020-05-04 NOTE — HEMODYNAMI
PATIENT:SAUL DE LA CRUZ                         MEDICAL RECORD: [id number]
: 50                                            LOCATION:[facility]          
ACCT# [id number]                                       ADMISSION DATE: 20
 
 
 Generatedon:202015:00
Patient name: SAUL DE LA CRUZ Patient #: [id number] Visit #: [id number] SSN:
  :
1950 Date of study: 2020
Page: Of
Hemodynamic Procedure Report
****************************
Patient Data
Patient Demographics
First Name: SAUL         Gender: Male
Last Name: DOROTHY        : 1950
Middle Initial: RAVI      Age: 69 year(s)
Patient #: [id number]       Race: Unknown
Visit #: [id number]
SSN: 
Accession #:
[id number]
Additional ID: [id number]
Contact details
Address: [address] [place] [address]         Phone: [phone]
State: [place]
City: [place]
Zip code: [address]
Past Medical History
Allergies
Allergen        Reaction        Date         Comments
Reported
Other allergy                   2019    PCN, MOLD
Other allergy                   2019     PCN, MOLD
Other allergy                   2020     pcn
Admission
Admission Data
Admission Date: 2020    Admission Time: 11:59
Arrival Date: 2020      Arrival Time: 0:00
Admit Source: Other         Insurance Payor: Medicare
HIC #: [id number]
Height (in.): 72            BSA: 2.7 (m2)
Height (cm.): 182.88        BMI: 47.47 (kg/m2)
Weight (lbs.): 350
Weight (kg.): 158.76
Lab Results
Lab Result Date: 2020   Lab Result Time: 0:00
Biochemistry
Name         Units    Result                Min      Max
BUN          mg/dl    13       --(--*-)--   7        18
Creatinine   mg/dl    1.2      --(---*)--   0.6      1.3
eGFR         ml/min   64.79711 *-(----)--   90       120
NONAFRICAN
CBC
Name         Units    Result                Min      Max
Hemoglobin   g/dl     15.3     --(-*--)--   13.5     17.5
Procedure
Procedure Types
Cath Procedure
 
Diagnostic Procedure
[facility] ED w/Coronaries
Sedation Charges
Moderate Sedation up to 15 minutes
Procedure Description
Procedure Date
Procedure Date: 2020
Procedure Start Time: 14:41
Procedure End Time: 14:58
Procedure Staff
Name                            Function
Broderick Roth MD                   Performing Physician
Rosa Davidson RT               Monitor
Gissel Robbins RN                Nurse
Leigh Ann Mejia RT              Scrub
Indication
Chest discomfort
Procedure Data
Cath Procedure
Fluoroscopy
Diagnostic fluoroscopy      Total fluoroscopy Time: 2.9
time: 2.9 min               min
Diagnostic fluoroscopy      Total fluoroscopy dose: 963
dose: 963 mGy               mGy
Contrast Material
Contrast Material Type                       Amount (ml)
Isovue 300                                   74
Entry Location
Entry     Primary  Successful  Side  Size  Upsize Upsize Entry    Closure     Blanco
ccessful  Closure
Location                             (Fr)  1 (Fr) 2 (Fr) Remarks  Device        
          Remarks
Radial                         Right 6 Fr                         Mechanical
artery                               Short                        Compression
Estimated blood loss: 10 ml
Diagnostic catheters
Device Type               Used For           End Catheter
Placement
DIAGNOSTIC Yuriy 110cm    Procedure
5Fr catheter ([id number])
Procedure Complications
No complications
Procedure Medications
Medication           Administration Route Dosage
0.9% NaCl            I.V.                 100 ml/hr
Oxygen               etCO2 Nasal cannula  2 l/min
Lidocaine 2%         added to field       20
Heparin Flush Bag    added to field       2 bags
(1000units/500ml NS)
Radial Cocktail      added to field       1 syringe
(Verapamil 2mg/Nitro
400mcg/Heparin
1500units)
Versed               I.V.                 2 mg
Fentanyl             I.V.                 50 mcg
Hemodynamics
Rest
BSA: 2.7 (m2) O2 Consumption: Estimated: 291.12 (ml/min) O2 Consumption indexed:
 Estimated:107.82
 
(ml/min/m) Heart Rate: 49 (bpm)
Pressure Samples
Time     Site     Value (mmHg) Purpose      Heart      Use
Rate(bpm)
14:45    LV       138/-4,5     Snapshot     49
14:46    AO       107/57(80)   Pullback     56
14:46    LV       123/4,12     Pullback     56
Gradients
Valve  Time  Site 1   Site 2     Mean    SEP/DFP    Peak To Heart  Use
(mmHg)  (sec/min)  Peak    Rate
(mmHg)  (bpm)
Aortic 14:46 LV       AO         9       18         16      56
123/4,12 107/57(80)
Calculations
Valve    P-P      Mean      Valve     Index  Valve    Source
Name              Gradient  Area             Flow
(cm2)
Aortic   16       9
16       9
Snapshots
Pre Cath      Intra         NCS           Post Cath
Vital Signs
Time     Heart  Resp   SPO2 etCO2   NIBP (mmHg) Rhythm  Pain    Sedation
Rate   (ipm)  (%)  (mmHg)                      Status  Level
(bpm)
14:25:14 56     19     97   38      176/97(129) SB      0 (11)  10(A)
, No
pain
14:29:40 50     12     100  35.8    189/97(131) SB      0 (11)  10(A)
, No
pain
14:34:04 53     13     97   15      180/92(131) SB      0 (11)  10(A)
, No
pain
14:38:27 52     15     96   35      155/85(118) SB      0 (11)  10(A)
, No
pain
14:42:49 49     16     98   38      162/87(130) SB      0 (11)  10(A)
, No
pain
14:47:09 54     10     98   14.9    140/70(102) SB      0 (11)  10(A)
, No
pain
14:51:27 59     13     98   37.2    152/82(101) SB      0 (11)  10(A)
, No
pain
14:55:33 55     14     96   29.8    136/86(112) SB      0 (11)  10(A)
, No
pain
Medications
Time     Medication       Route   Dose    Verified Delivered Reason     Notes  E
ffectiveness
by       by
14:24:10 0.9% NaCl        I.V.    100     Broderick     Gissel     used for
ml/hr   Gonzalez Robbins   procedure
RN
14:24:17 Oxygen           etCO2   2 l/min Broderick     Gissel     used for
Nasal           Gonzalez Robbins   procedure
cannula                  RN
14:24:22 Lidocaine 2%     added   20ml    Broderick     Broderick      for local
 
to      vial    Gonzalez Roth MD  anesthetic
field
14:24:26 Heparin Flush    added   2 bags  Broderick     Broderick      used for
Bag              to              Gonzalez Roth MD  procedure
(1000units/500ml field
NS)
14:24:32 Radial Cocktail  added   1       Broderick     Broderick      used for
(Verapamil       to      syringe Gonzalez Roth MD  procedure
2mg/Nitro        field
400mcg/Heparin
1500units)
14:37:55 Versed           I.V.    2 mg    Broderick     Gissel     for
Gonzalez Robbins   sedation
RN
14:38:11 Fentanyl         I.V.    50 mcg  Broderick     Gissel     for
Gonzalez Robbins   sedation
RN
Procedure Log
Time     Note
14:14:13 Admit Source: Other
14:14:17 Arrival Date: 2020 12:00:00 AM
14:14:43 Insurance Payor : Medicare
14:14:52 Patient Height : 72 inches
14:15:10 Patient Weight : 350 lbs
14:: Lab Result : BUN 13 mg/dl
:: Lab Result : eGFR NONAFRICAN 64.38880 ml/min
14:18: Lab Result : Hemoglobin 15.3 g/dl
14:18: Lab Result : Creatinine 1.2 mg/dl
14:19: Indication : Chest discomfort
14:19:24 Procedure Status Elective Heart Cath (OP).
14:19: Leigh Ann Mejia RT(R) (CV) sent for patient. Start
room use.
14:19: Time tracking: Regular hours (M-F 7:00 - 5:00)
14:19:37 Plan of Care:Hemodynamics will remain stable., Cardiac
rhythm will remain stable., Comfort level will be
maintained., Respiratory function will remain
adequate., Patient/ family verbilizes understanding of
procedure., Procedure tolerated without complication.,
Recovers from procedure without complications..
14:19:43 Patient received from Pre/Post Procedure Room to CCL 1
Alert and oriented. Tansferred to table in Supine
position.
14:19:45 Warm blankets applied, and janes hugger turned on for
patient comfort.
14:19:46 Correct patient and procedure confirmed by team.
14:19:47 ECG and BP/O2 sat monitors applied to patient.
14:20:12 H&P Date Dictated: 4/15/2020 Within 30 days and on
chart., H&P Addendum completed by physician on day of
procedure. (MUST COMPLETE FOR ALL OUTPATIENTS).
14:20:14 Pre-procedure instructions explained to patient.
14:20:17 Family unavailable.
14:20:19 Patient NPO since Midnight.
14:20:35 Patient allergic to Other allergypcn
14:20:38 Is the patient allergic to Iodine/contrast media? No.
14:20:49 Is patient on blood thinner?No
14:20:52 Patient diabetic? No.
14:20:59 Snore? Yes
14:21:02 Sleep apnea? No
14:21:09 Dentures? No ?
14:21:13 Patient pain scale 0/10 ?.
 
14:21:22 IV patent on arrival in left forearm with 0.9% NaCl at
KVO.
14:21:25 Lab results completed and on chart.
14:22:39 Stress Test: yes; abnormal inferior/lateral
14::43 Right Radial & Right Groin area was prepped with
chlora-prep and draped in sterile fashion
14::44 Alarms reviewed by R. N.
14::45 Sharps counted by scrub and verified by R.N.
14:24:00 Vital chart was started
14:24:10 0.9% NaCl 100 ml/hr I.V. was administered by Gissel Robbins RN; used for procedure; Verbal order read back
and verified.
14:24:14 Physician arrived
14:24:17 Oxygen 2 l/min etCO2 Nasal cannula was administered by
Gissel Robbins RN; used for procedure; Verbal order
read back and verified.
14:24:22 Lidocaine 2% 20ml vial added to field was administered
by Broderick Roth MD; for local anesthetic; Verbal order
read back and verified.
14:24:26 Heparin Flush Bag (1000units/500ml NS) 2 bags added to
field was administered by Broderick Roth MD; used for
procedure; Verbal order read back and verified.
14:24:32 Radial Cocktail (Verapamil 2mg/Nitro 400mcg/Heparin
1500units) 1 syringe added to field was administered
by Broderick Roth MD; used for procedure; Verbal order
read back and verified.
14:25:40 Use device set Radial Dx or PCI
14:25:42 ACIST Syringe (68517) opened to sterile field.
14:25:42 Medline Cath Pack ([id number]) opened to sterile field.
14:25:43 Bag Decanter (2002S) opened to sterile field.
14:25:44 ACIST Hand Control (73704) opened to sterile field.
14:25:45 ACIST Manifold (33219) opened to sterile field.
14:25:47 MBrace Wrist Support ([id number]) opened to sterile
field.
14:25:48 NEEDLE Cook 21G 4cm Radial (Z66694) opened to sterile
field.
14:25:49 EMERALD Guide Wire (095-754) opened to sterile field.
14:25:50 SHEATH 6FR RAIN ([id number]) opened to sterile field.
14:36:22 --------ALL STOP TIME OUT------
14:36:24 Final Timeout: patient, procedure, and site verified
with staff and physician. All members of the team are
in agreement.
14:36:27 Right Radial & Right Groin site verified by team.
14:36:32 Fire Safety Assessment: A--An alcohol-based skin
anteseptic being used preoperatively., C--Open oxygen
or nitrous oxide is being used., D--An ESU, laser, or
fiber-optic light is being used.
14:36:38 Physical assessment completed. ASA score P 3 - A
patient with severe systemic disease as per Broderick Roth MD.
14:36:47 2) 60-89 Mildly reduced kidney function, and other
findings (as for stage 1) point to kidney disease.
14:36:53 Maximum allowable contrast dose (3.7 X eGFR X 0.75)177
ml.
14:36:58 Sedation plan: IV Moderate Sedation Medication:Versed,
Fentanyl
14:37:55 Versed 2 mg I.V. was administered by Gissel Robbins RN;
for sedation; Verbal order read back and verified.
14:38:11 Fentanyl 50 mcg I.V. was administered by Gissel Robbins RN; for sedation; Verbal order read back and verified.
 
14:39:35 Zero performed for pressure channel P1
14:39:38 Zero performed for pressure channel P1
14:39:45 Zero performed for pressure channel P1
14:40:12 Zero performed for pressure channel P1
14:40:26 Procedure started.
14:40:26 Full Disclosure recording started
14:41:44 Local anesthetic to right radial artery with Lidocaine
2% by Broderick Roth MD.**INITIAL ACCESS ONLY**
14:41:53 A 6 Fr Short sheath was inserted into the Right Radial
artery
14:42:14 J wire advanced.
14:44:51 A DIAGNOSTIC Yuriy 110cm 5Fr catheter ([id number]) was
advanced over the wire and used for Procedure.
14:45:03 LV angiography performed.
14:46:08 EF : 55 %
14:48:36 LCA angiography performed.
14:52:26 RCA angiography performed.
14:53:31 ZEPHYR REGULAR TR BAND (951176) opened to sterile
field.
14:54:12 Catheter removed.
14:54:29 Sheath removed intact; hemostasis achieved with
Mechanical Compression to the Right Radial artery.
14:54:34 Procedure ended.(Physican Out)
14:55:03 Fluoroscopy time 02.90 minutes.
14:55:08 Flurop Dose total: 963
14:55:08 Fluoroscopy dose: 963 mGy
14:55:14 Dose Area Product 14356 mGy/cm.
14:55:20 Contrast amount:Isovue 300 74ml.
14:55:23 Maximum allowable dose exceeded? No.
14:55:28 Converse band inflated with 10cc of air.
14:55:30 Insertion/operative site no bleeding no hematoma.
14:55:40 Post Procedure Pulses reassessed and unchanged
14:55:54 Post-procedure physical assessment completed. ASA
score P 3 - A patient with severe systemic disease as
per Broderick Roth MD.
14:55:58 Post procedure rhythm: unchanged.
14:56:02 Estimated blood loss: 10 ml
14:56:04 Post procedure instruction explained to
patient.Patient verbalizes understanding.
14:56:37 Procedure type changed to Cath procedure, Diagnostic
procedure, LHC, [facility] w/Coronaries, Sedation Charges,
Moderate Sedation up to 15 minutes
14:57:20 Procedure and supply charges have been captured,
reviewed, submitted and are correct.
14:57:39 Procedure Complication : No complications
14:57:42 Vital chart was stopped
14:57:49 [facility] Findings: MVD- manage w/ optimal medication
therapy
14:57:53 Operative report dictated upon procedure completion.
14:57:56 See physician's report for complete and final results.
14:58:02 Report given to Pre/Post Procedure Room.
14:58:08 Patient transfered to Pre/Post Procedure Room with
Stretcher.
14:58:10 Procedure ended.
14:58:10 Full Disclosure recording stopped
14:58:13 End room use (Document Last)
14:58:37 End room use (Document Last)
14:59:05 End room use (Document Last)
14:59:37 End room use (Document Last)
Device Usage
 
Item Name   Manufacture  Quantity  Catalog      Hospital Part     Current Minima
l Lot# /
Number       Charge   Number   Stock   Stock   Serial#
Code
ACIST       Acist        1         40894        705734   707029   728528  20
Syringe     Fresvii
(53374)     Systems Inc
Medline     Medline      1         [id number]    007956   27150    917935  5
Cath Pack
([id number])
Bag         Microtek     1                 158016   58128    906114  5
Decanter    Medical Inc.
(S)
ACIST Hand  Acist        1         99536        519467   021769   233821  5
Control     Medical
(24000)     Systems Inc
ACIST       Acist        1         36872        833621   901690   930398  5
Manifold    Medical
(85768)     Systems Inc
MBrace      Advanced     1         [id number]  410976   57215    370699  5
Wrist       Vascular
Support     Dynamics
(075077123)
NEEDLE Cook Cook Medical 1         [id number]       122357   669595   460903  5
21G 4cm
Radial
(A74466)
EMERALD     Cardinal     1         502-079      272696   838754   773716  5
Guide Wire  Health
(905-892)
SHEATH 6FR  Cardinal     1         6858703      678675   4703722  119056  5
[facility]
(7810947)
DIAGNOSTIC  Terumo       1               641509   933702   825692  5
Yuriy 110cm
5Fr
catheter
(203923)
ZEPHYR      Cardinal     1         363746       586832   4101750  675719  5
REGULAR TR  Health
BAND
(817018)
Signature Audit [place]
Stage           Time        Signature      Unsigned
Intra-Procedure 2020    Rosa Davidson
2:58:37 PM  RT(R)
Intra-Procedure 2020    Gissel Robbins
2:59:05 PM  RN
Intra-Procedure 2020    Broderick Roth MD
2:59:37 PM
Intra-Procedure 2020    Broderick Roth MD
3:00:30 PM
 
 
 
[address] [place] [address]

## 2020-05-04 NOTE — NUR
DISCUSSED DISCHARGE INSTRUCTIONS WITH PT. HE VOICED UNDERSTANDING.
VSS AT THIS TIME. RIGHT WRIST Z BAND REMOVED AND DRESSING APPLIED.
TOLERATED WELL. NO BLEEDING/HEMATOMA NOTED. PIV D/C'D WITH CATH TIP
INTACT. PT INSTRUCTED TO GET UP AND DRESSED AT THIS TIME. RIGHT WRIST
BRACE IN PLACE. CALL LIGHT WITHIN REACH.

## 2020-05-04 NOTE — NUR
PT RESTING COMFORTABLY. VSS. RIGHT WRIST Z BAND IN PLACE. NO
BLEEDING/HEMATOMA NOTED. CALL LIGHT WITHIN REACH. PT'S WIFE CALLED
AND UPDATED ON PT'S STATUS AND DISCHARGE PLANS.